# Patient Record
Sex: MALE | Race: ASIAN | Employment: OTHER | URBAN - METROPOLITAN AREA
[De-identification: names, ages, dates, MRNs, and addresses within clinical notes are randomized per-mention and may not be internally consistent; named-entity substitution may affect disease eponyms.]

---

## 2021-02-23 ENCOUNTER — HOSPITAL ENCOUNTER (OUTPATIENT)
Dept: PHYSICAL THERAPY | Age: 36
Discharge: HOME | End: 2021-02-23
Payer: OTHER GOVERNMENT

## 2021-02-23 PROCEDURE — 97166 OT EVAL MOD COMPLEX 45 MIN: CPT

## 2021-02-23 PROCEDURE — 97530 THERAPEUTIC ACTIVITIES: CPT

## 2021-02-23 NOTE — PROGRESS NOTES
OT DAILY TREATMENT NOTE     Patient Name: Stewart Duffy  Date:2021  : 1985  [x]  Patient  Verified  Payor:  / Plan: [employer] [facility] [address] AND DEPENDENTS / Product Type:  /    In time:1035  Out time:1215  Total Treatment Time (min): 40  Visit #: 1 of 8    Treatment Area: Upper extremity weakness [R29.898]  Cerebral infarction, unspecified [I63.9]    SUBJECTIVE  Pain Level (0-10 scale): 0/10  Any medication changes, allergies to medications, adverse drug reactions, diagnosis change, or new procedure performed?: [x] No    [] Yes (see summary sheet for update)  Subjective functional status/changes:   [] No changes reported  Headaches, eyes get tired, can't keep on lines, trouble remembering things    OBJECTIVE    30 min []Eval                  []Re-Eval         10 min Therapeutic Activity:  []  See flow sheet :   Rationale: scanning  to improve the patients ability to read  Scanning reading aloud reducing font size, spacing  until errors made       With   [] TE   [] TA   [] neuro   [] other: Patient Education: [x] Review HEP    [] Progressed/Changed HEP based on: use of colored guide  [] positioning   [] body mechanics   [] transfers   [] heat/ice application   [] Splint wear/care   [] Sensory re-education   [] scar management      [] other:            Other Objective/Functional Measures:   Subjective: pt is a right hand dominant, 35 y.o.y/o, male who had sudden onset of headache and confusion that did not resolve and went to ER was diagnosed with CVA, sent to hospital in [place] x 6 days  Prior level of function: [employer] , running, board games,movies,  TV,  Cooking, driving  Pain BFQSB:(4-DQ pain 10-debilitating pain) no    Headaches recurring  Current functional limitations/living situation:  no children, wife  [employer]    Medical hx: HTN    Medications: See the written copy of this report in the patient's paper medical record.        Objective:  ROM WNL:  Strength:WNL  Hand Strength:   Gross Grasp      Right  80      Left 70        Nine-Hole Peg Test:  Left= _16____seconds  Right=__16___seconds  Finger Opposition:WNL  ADLs Independent     Light Meal Prep:    []MaxA   []ModA   [x]Christine   [] CGA   []SBA   []Colleen   []Independent  Driving:       [x]MaxA   []ModA   []Christine   [] CGA   []SBA   []Colleen   []Independent  Money Mgmt:        []MaxA   []ModA   []Christine   [] CGA   []SBA   []Colleen   []IndependentTBA    Coordination   GM                           FM [x]WFL          [] Impaired   [x]WFL          [] Impaired    Tone/Motor Control [x]WFL          [] Impaired    Midline Symmetry [x]WFL          [] Impaired    Visual Perception:                    R/L   Neglect           R/L Discrimination                   Body Scheme []WFL          [] Impaired TBA  [x]WFL          [] Impaired     []WFL          [x] Impaired visual    [x]WFL          [] Impaired    Visual Motor Skills                           Tracking                           Tracing                            Writing   [x]WFL          [] Impaired     [x]WFL          [] Impaired   [x]WFL          [] Impaired    Cognition [x]Person         [x]Place            [x]Date   [x]Situation/ Behavior    Follows Commands  []     1-step  [] 2-step   [x]Multi-step      Memory:                             STM                             LTM   []WFL          [x] Impaired   [x]WFL          [] Impaired    Safety Awareness [x]WFL          [] Impaired    Judgment  [x]WFL          [] Impaired    Express Needs [x]WFL          [] Impaired           Pain Level (0-10 scale) post treatment: 0/10    ASSESSMENT/Changes in Function:    [x]  See Plan of Care  []  See progress note/recertification  []  See Discharge Summary           PLAN  []  Upgrade activities as tolerated     []  Continue plan of care  []  Update interventions per flow sheet       []  Discharge due to:_  []  Other:LAKSHMI Gotti/L 2/23/2021  10:38 AM    No future appointments.

## 2021-02-23 NOTE — PROGRESS NOTES
[facility] [place] [facility]  [address] [facility] [address] [facility][1]  [phone] fax    Plan of Care/Statement of Necessity for Occupational Therapy Services    Patient name: Sherley Neri Start of Care: 2021   Referral source: MD Xiomara Rojas, SLP : 1985    Medical Diagnosis: Cerebral infarction, unspecified [I63.9]  Visual field cut [H53.40]  Payor:  / Plan: [employer]PENDENTS / Product Type:  /  Onset Date:21    Treatment Diagnosis: Decreased scanning, cognitive skills   Prior Hospitalization: see medical history Provider#: [id number]   Medications: Verified on Patient summary List    Comorbidities: HTN, CVA   Prior Level of Function: [place] , running, board games,movies,  TV,  Cooking, driving          The Plan of Care and following information is based on the information from the initial evaluation. Assessment/ key information: pt is a right hand dominant, 29 y/o, male who had sudden onset of headache and confusion   that did not resolve while on vacation in [place] and went to ER was diagnosed with CVA, sent to hospital in [place] x 6 days. He denies pain at this time but reports mild intermittent headaches. He has full AROM of bilateral UEs and strength is 4+/5. Fine motor skills are WNL. He has difficulty scanning at table level and must use finger to keep place on line. With this strategy he is able tolocate targets well but slowly. He tends to start non reading task at right side and work his way towards. left. He is able to read letters spaced well in 16 font with guide under line with 90%  Accuracy. He reports difficulty with short term memory and with remembering what he has read. Reading is limited to short articles vs books due to eye fatigue and short term memory deficits.   He is performing self care without assist but is not currently working or driving due to vision and cognitive issues. His Brain Injury vision assessment Battery for Adults shows 5 areas of concern. Self reported assessment of visual performance shows difficulties in financial management, health management, and reading. He will benefit from skilled occupational therapy to improve ability to scan for information and complete computer and household tasks to allow him to return to work effectively. Evaluation Complexity: History LOW Complexity : Brief history review  Examination LOW Complexity : 1-3 performance deficits relating to physical, cognitive , or psychosocial skils that result in activity limitations and / or participation restrictions  Clinical Decision Making LOW Complexity : No comorbidities that affect functional and no verbal or physical assistance needed to complete eval tasks   Overall Complexity Rating: LOW     Problem List: Decreased ADL/functional abilities , Decreased activity tolerance and Other     Treatment Plan may include any combination of the following: Therapeutic activities, Patient education and ADLs/IADLs    Patient / Family readiness to learn indicated by: asking questions, trying to perform skills and interest    Persons(s) to be included in education:   patient (P)    Barriers to Learning/Limitations: yes;  cognitive, reading/writing and sensory deficits-vision/hearing/speech    Patient Goal (s): learn to cope    Patient Self Reported Health Status: good    Rehabilitation Potential: good    Short Term Goals: To be accomplished in 2 weeks:  1. Patient will be independent in use of strategies to improve ability to scan for information. 2. Patient will be familiar with home program activities to improve scanning and attention to left visual field. 3.  Patient will be familiar with strategies to improve recall of key facts in information read to improve pleasure and recall of content for work tasks.   4.  Patient will be able to visually shift from table level to computer level information in order to record and complete tasks at work. Long Term Goals: To be accomplished in 4 weeks:   1. Patient will be able to use strategies to enable him to return to reading and recalling information from  longer text (eg books)without eye fatigue . 2.  Patient will consistently scan left to right when performing all tasks without cueing. 3.  Patient will return to successful home financial management tasks due to improved visual scanning and attention/memeory. 4.  Patient will achieve 95% accuracy or better at locating embedded information in text. Frequency / Duration: Patient to be seen 2 times per week for 4 weeks:    Patient/ Caregiver education and instruction: Diagnosis, prognosis, self care, activity modification and exercises   [x]  Plan of care has been reviewed with LAKSHMI Lema/L 2/23/2021 3:02 PM    _____________________________________________________________________    I certify that the above Therapy Services are being furnished while the patient is under my care. I agree with the treatment plan and certify that this therapy is necessary.     [de-identified] Signature:____________Date:_________TIME:________     Kevinaniyahdavid Mom, Not On File, MD  ** Signature, Date and Time must be completed for valid certification **    Please sign and return to [facility] [employer] [facility]   [address] [place] [address]  [phone] fax

## 2021-02-23 NOTE — PROGRESS NOTES
[facility] [address] [facility] 
[phone] fax Plan of Care/Statement of Necessity for Occupational Therapy Services Patient name: Elissa Shannon Start of Care: 2021 Referral source: [facility], Not On File, MD : 1985 Medical Diagnosis: Cerebral infarction, unspecified [I63.9] Visual field cut [H53.40] Payor:  / Plan: [address] DEPENDENTS / Product Type:  /  Onset Date:*** Treatment Diagnosis: ***  
Prior Hospitalization: see medical history Provider#: [id number] Medications: Verified on Patient summary List  
 Comorbidities: *** 
 Prior Level of Function: *** The Plan of Care and following information is based on the information from the initial evaluation. Assessment/ key information: *** Evaluation Complexity: History {OT OP History :53857} Examination {OT OP Examination :30839} Clinical Decision Making {OT OP Decision Making :09586} Overall Complexity Rating: {PT OP Complexity:29649} Problem List: {BSHSI OT PROBLEM OHLE:88055} Treatment Plan may include any combination of the following: {BSHSI IP OT TREATMENT PLAN:} Patient / Family readiness to learn indicated by: {Outpt PT Patient Family Readiness to Learn:98126} Persons(s) to be included in education:   {BSHSI IP PATTERSON PT PERSONS PTTF:60956} Barriers to Learning/Limitations: {barriers to learning/limitations:73827} Patient Goal (s): *** 
 
Patient Self Reported Health Status: {Outpt PT Rehabilitation Potential:74149} Rehabilitation Potential: {Outpt PT Rehabilitation Potential:95534} Short Term Goals: To be accomplished in *** {BSHSI OP WEEKS/TREATMENTS:}: 
*** Long Term Goals: To be accomplished in *** {BSHSI OP WEEKS/TREATMENTS:}:  
*** Frequency / Duration: Patient to be seen *** times per week for *** {BSHSI OP WEEKS/TREATMENTS:}: 
 
 Patient/ Caregiver education and instruction: Diagnosis, prognosis, {Outpt PT patient caregiver ed.:00209} [x]  Plan of care has been reviewed with LAKSHMI Ruano/L 2/23/2021 3:29 PM 
 
_____________________________________________________________________ I certify that the above Therapy Services are being furnished while the patient is under my care. I agree with the treatment plan and certify that this therapy is necessary. Physician's Signature:____________Date:_________TIME:________ Bshsi, Not On File, MD 
** Signature, Date and Time must be completed for valid certification ** Please sign and return to [facility]  [address] [facility] 
[phone] fax

## 2021-03-02 ENCOUNTER — HOSPITAL ENCOUNTER (OUTPATIENT)
Dept: PHYSICAL THERAPY | Age: 36
Discharge: HOME | End: 2021-03-02
Payer: OTHER GOVERNMENT

## 2021-03-02 PROCEDURE — 97530 THERAPEUTIC ACTIVITIES: CPT

## 2021-03-02 NOTE — PROGRESS NOTES
OT DAILY TREATMENT NOTE     Patient Name: Dominick Hall  Date:3/2/2021  : 1985  [x]  Patient  Verified  Payor:  / Plan: [employer] [facility] [address] AND DEPENDENTS / Product Type:  /    In time:550  Out time:635  Total Treatment Time (min): 39  Visit #: 2 of 8    Treatment Area: Cerebral infarction, unspecified [I63.9]  Visual field cut [H53.40]    SUBJECTIVE  Pain Level (0-10 scale): 0/10  Any medication changes, allergies to medications, adverse drug reactions, diagnosis change, or new procedure performed?: [x] No    [] Yes (see summary sheet for update)  Subjective functional status/changes:   [] No changes reported  Headaches getting better    OBJECTIVE    45 min Therapeutic Activity:  []  See flow sheet :   Rationale: scanning , atttention  to improve the patients ability to recall information, read for accurate information  Scan boards numbers and letters no errors in sequence or locating chips  Recall with interference worksheet  Saccades reducing font size-no errors but slowing when reduced to 2M  Recall with interference recalling pairs of cards when another pair introduced    With   [] TE   [] TA   [] neuro   [] other: Patient Education: [x] Review HEP    [] Progressed/Changed HEP based on:  Reviewed chunking of numbers strategy  [] positioning   [] body mechanics   [] transfers   [] heat/ice application   [] Splint wear/care   [] Sensory re-education   [] scar management      [] other:            Other Objective/Functional Measures:   Recall with interference score 75%, errors or forgotten simple facts when more complex task introduced as interference.   Recall of cards seen 63%     Pain Level (0-10 scale) post treatment: 0/10    ASSESSMENT/Changes in Function: good ability to perform accurate saccades but speed slows as font decreases in size    Patient will continue to benefit from skilled OT services to modify and progress therapeutic interventions, instruct in home and community integration and scanning and recall to attain remaining goals. []  See Plan of Care  []  See progress note/recertification  []  See Discharge Summary         Progress towards goals / Updated goals:  1. Patient will be independent in use of strategies to improve ability to scan for information. 3/2/21 practicing saccades  2. Patient will be familiar with home program activities to improve scanning and attention to left visual field. 3/2/21 sudoku for homework,    3. Patient will be familiar with strategies to improve recall of key facts in information read to improve pleasure and recall of content for work tasks. 3/2/21 attention to task with playing cards task  4. Patient will be able to visually shift from table level to computer level information in order to record and complete tasks at work.       Long Term Goals: To be accomplished in 4 weeks:          1. Patient will be able to use strategies to enable him to return to reading and recalling information from  longer text (eg books)without eye fatigue . 2.  Patient will consistently scan left to right when performing all tasks without cueing. 3.  Patient will return to successful home financial management tasks due to improved visual scanning and attention/memeory.     4.  Patient will achieve 95% accuracy or better at locating embedded information in text    PLAN  []  Upgrade activities as tolerated     [x]  Continue plan of care  []  Update interventions per flow sheet       []  Discharge due to:_  []  Other:_      LAKSHMI Hayes/L 3/2/2021  2:56 PM    Future Appointments   Date Time Provider Alicia Segura   3/2/2021  6:00 PM Tally Spray, OTR/L [facility]   3/9/2021  6:00 PM Tally [place], OTR/L [facility]   3/16/2021  6:00 PM Tally Spray, OTR/L [facility]   3/23/2021  6:00 PM Tally Spray, OTR/L [facility]

## 2021-03-09 ENCOUNTER — HOSPITAL ENCOUNTER (OUTPATIENT)
Dept: PHYSICAL THERAPY | Age: 36
Discharge: HOME | End: 2021-03-09
Payer: OTHER GOVERNMENT

## 2021-03-09 PROCEDURE — 97530 THERAPEUTIC ACTIVITIES: CPT

## 2021-03-09 NOTE — PROGRESS NOTES
OT DAILY TREATMENT NOTE     Patient Name: Devi Gonzales  Date:3/9/2021  : 1985  [x]  Patient  Verified  Payor:  / Plan: [facility]  ACTIVE DUTY AND DEPENDENTS / Product Type:  /    In time:600  Out time:645  Total Treatment Time (min): 45  Visit #: 3 of 8    Treatment Area: Cerebral infarction, unspecified [I63.9]  Unspecified visual field defects [H53.40]    SUBJECTIVE  Pain Level (0-10 scale): 0/10  Any medication changes, allergies to medications, adverse drug reactions, diagnosis change, or new procedure performed?: [x] No    [] Yes (see summary sheet for update)  Subjective functional status/changes:   [] No changes reported  No problem with soduku    OBJECTIVE  Oh, I didn't get that ( re instructions for worksheet)            45 min Therapeutic Activity:  []  See flow sheet :   Rationale: Scanning, recall tasks  to improve the patients ability to locate items in left visual field, use memory strategies  Scanning worksheet locating numbers in order-4 trials  Scanning worksheet for embedded 88 in varied numbers  Scanning task from table to \"computer level\" completing 1/4 inch complex peg design x 2  Recall task using categorization strategy to recall 12 words in 3 categories    With   [] TE   [] TA   [] neuro   [] other: Patient Education: [x] Review HEP    [] Progressed/Changed HEP based on: categorization strategy, worksheet  [] positioning   [] body mechanics   [] transfers   [] heat/ice application   [] Splint wear/care   [] Sensory re-education   [] scar management      [] other:            Other Objective/Functional Measures:   No errors in Soduku  No errors in embedded 88 task  REquired multiple trials to complete task of finding numbers in order, despite instructions being repeated and written as well as oral  Word list recall  with one confabulation  Trial following creating categories  with 1 confabulation, 1 error in category     Pain Level (0-10 scale) post treatment: 0/10    ASSESSMENT/Changes in Function: Difficulty following instructions , poor recall even with use of categorization strategy    Patient will continue to benefit from skilled OT services to modify and progress therapeutic interventions and instruct in home and community integration to attain remaining goals. []  See Plan of Care  []  See progress note/recertification  []  See Discharge Summary         Progress towards goals / Updated goals:  1.  Patient will be independent in use of strategies to improve ability to scan for information. 3/2/21 practicing saccades  2. Patient will be familiar with home program activities to improve scanning and attention to left visual field. 3/2/21 sudoku for homework,    3/9/21 performed soduku well    3.  Patient will be familiar with strategies to improve recall of key facts in information read to improve pleasure and recall of content for work tasks. 3/2/21 attention to task with playing cards task  3/9/21 working on categorization strategy  4.  Patient will be able to visually shift from table level to computer level information in order to record and complete tasks at work. 3/9/21 good performance staying on line with peg task on varied planes and across visual field      Long Term Goals: To be accomplished in 4 weeks:          1.  Patient will be able to use strategies to enable him to return to reading and recalling information from  longer text (eg books)without eye fatigue .   2.  Patient will consistently scan left to right when performing all tasks without cueing.    3.  Patient will return to successful home financial management tasks due to improved visual scanning and attention/memeory.    4.  Patient will achieve 95% accuracy or better at locating embedded information in text    PLAN  []  Upgrade activities as tolerated     []  Continue plan of care  []  Update interventions per flow sheet       []  Discharge due to:_  []  Other:_      Vassie Peabody Oniel López, OTR/L 3/9/2021  3:00 PM    Future Appointments   Date Time Provider Alicia Imelda   3/9/2021  6:00 PM Anabella Ceja, OTR/L [facility]   3/16/2021  6:00 PM Anabella Ceja, OTR/L [facility]   3/23/2021  6:00 PM Anabella Ceja, OTR/L [facility]

## 2021-03-16 ENCOUNTER — HOSPITAL ENCOUNTER (OUTPATIENT)
Dept: PHYSICAL THERAPY | Age: 36
Discharge: HOME | End: 2021-03-16
Payer: OTHER GOVERNMENT

## 2021-03-16 PROCEDURE — 97530 THERAPEUTIC ACTIVITIES: CPT

## 2021-03-16 NOTE — PROGRESS NOTES
OT DAILY TREATMENT NOTE     Patient Name: Jessenia Brands  Date:3/16/2021  : 1985  [x]  Patient  Verified  Payor:  / Plan: [employer] [facility] AND DEPENDENTS / Product Type:  /    In time:600  Out time:645  Total Treatment Time (min): 45  Visit #: 4 of 8    Treatment Area: Cerebral infarction, unspecified [I63.9]  Unspecified visual field defects [H53.40]    SUBJECTIVE  Pain Level (0-10 scale): 0/10  Any medication changes, allergies to medications, adverse drug reactions, diagnosis change, or new procedure performed?: [x] No    [] Yes (see summary sheet for update)  Subjective functional status/changes:   [] No changes reported  Did better with the categories    OBJECTIVE         45 min Therapeutic Activity:  []  See flow sheet :   Rationale: recal and scanning  to improve the patients ability to perform job tasks  Card recall pairs with interference  Reading 16 font with close spacing  Reading paragraph for comprehension/memory of content  Recall 12 words in categories when word list viewed for 1 minute  Recall of names and matching to faces             With   [] TE   [] TA   [] neuro   [] other: Patient Education: [x] Review HEP    [] Progressed/Changed HEP based on: progress made  [] positioning   [] body mechanics   [] transfers   [] heat/ice application   [] Splint wear/care   [] Sensory re-education   [] scar management      [] other:            Other Objective/Functional Measures:   Card recall 75% (63 first day)  Recall word list 11/12 homework, 12/12 today  Matching faces to names 5/5, 4/5  Reading 16pt font no errors no use of finger to keep on line  REading comprehension 3/7 questions correct, 1/5 questions correct     Pain Level (0-10 scale) post treatment: 0/10    ASSESSMENT/Changes in Function: Improved recall using categories, improved ability to keep on line when scanning without use of finger or guide, poor reading comprehension/recall     Patient will continue to benefit from skilled OT services to modify and progress therapeutic interventions and instruct in home and community integration to attain remaining goals. []  See Plan of Care  []  See progress note/recertification  []  See Discharge Summary         Progress towards goals / Updated goals:  1.  Patient will be independent in use of strategies to improve ability to scan for information. 3/2/21 practicing saccades  3/16/21 good accuracy of scanning today  2. Patient will be familiar with home program activities to improve scanning and attention to left visual field.   3/2/21 sudoku for homework,    3/9/21 performed soduku well  3/16/21 met with cheo     3.  Patient will be familiar with strategies to improve recall of key facts in information read to improve pleasure and recall of content for work tasks. 3/2/21 attention to task with playing cards task  3/9/21 working on categorization strategy  3/16/21 good performance with strategy, still with poor recall of important facts in paragraph  4.  Patient will be able to visually shift from table level to computer level information in order to record and complete tasks at work. 3/9/21 good performance staying on line with peg task on varied planes and across visual field      Long Term Goals: To be accomplished in 4 weeks:          1.  Patient will be able to use strategies to enable him to return to reading and recalling information from  longer text (eg books)without eye fatigue .   2.  Patient will consistently scan left to right when performing all tasks without cueing.    3.  Patient will return to successful home financial management tasks due to improved visual scanning and attention/memeory.    4.  Patient will achieve 95% accuracy or better at locating embedded information in text    PLAN  []  Upgrade activities as tolerated     []  Continue plan of care  []  Update interventions per flow sheet       []  Discharge due to:_  []  Other:_      Aura Henao OTR/L 3/16/2021  6:23 PM    Future Appointments   Date Time Provider Alicia Segura   3/23/2021  6:00 PM Sasha Becerra, OTR/L [facility]

## 2021-03-30 ENCOUNTER — HOSPITAL ENCOUNTER (OUTPATIENT)
Dept: PHYSICAL THERAPY | Age: 36
Discharge: HOME | End: 2021-03-30
Payer: OTHER GOVERNMENT

## 2021-03-30 PROCEDURE — 97530 THERAPEUTIC ACTIVITIES: CPT

## 2021-03-30 PROCEDURE — 97535 SELF CARE MNGMENT TRAINING: CPT

## 2021-03-30 NOTE — PROGRESS NOTES
OT DAILY TREATMENT NOTE     Patient Name: Linda Abbott  Date:3/30/2021  : 1985  [x]  Patient  Verified  Payor:  / Plan: [facility]  ACTIVE DUTY AND DEPENDENTS / Product Type:  /    In time:515  Out time:610  Total Treatment Time (min): 54  Visit #: 1 of 4  Treatment Area: Cerebral infarction, unspecified [I63.9]  Unspecified visual field defects [H53.40]    SUBJECTIVE  Pain Level (0-10 scale): 0/10  Any medication changes, allergies to medications, adverse drug reactions, diagnosis change, or new procedure performed?: [x] No    [] Yes (see summary sheet for update)  Subjective functional status/changes:   [] No changes reported  Glad to be going back to work    OBJECTIVE    30 min Therapeutic Activity:  []  See flow sheet :   Rationale: scanning problem solving, recall for informaiton read  to improve the patients ability to perform work tasks without difficulty  REcall, memory strategy tasks using worksheets and informaiton gathered when read, heard or seen  REading organizing information task worksheet        25 min Self Care/Home Management: Functional skills for work tasks   Rationale: strategy development  to improve the patients ability to problem solving and completing tasks at work  Computer task to generate chart from paper table level giving information on hours worked by employee, hours of services, number of checks per month  Practical math tasks word problems to work on reading accurately to develop results      With   [] TE   [] TA   [] neuro   [] other: Patient Education: [x] Review HEP    [] Progressed/Changed HEP based on: use of visualization strategy, making up sentence to recall facts, underlining, paraphrasing  [] positioning   [] body mechanics   [] transfers   [] heat/ice application   [] Splint wear/care   [] Sensory re-education   [] scar management      [] other:            Other Objective/Functional Measures:   Completed charts on computer dissecting and organizing information well, completing formulas etc  Recalled 2/2 items heard in sentence 100%  Recalled 3/6 words in 3 word sequences seen correctly in order  Recalled 8/8 words in 4 word sequence seen correctly in order  Recalled key element in story heard to respond to question 5/6 trials  Recalled 9/10 facts about house seen  Errors in alphabetizing and performing tasks according to instruction, missing one fact when completing problem solving tasks     Pain Level (0-10 scale) post treatment: 0/10    ASSESSMENT/Changes in Function: Better recall when heard vs read himself, better recall with visualization    Patient will continue to benefit from skilled OT services to modify and progress therapeutic interventions and instruct in home and community integration to attain remaining goals. []  See Plan of Care  []  See progress note/recertification  []  See Discharge Summary         Progress towards goals / Updated goals:  3.  Patient will be familiar with strategies to improve recall of key facts in information read to improve pleasure and recall of content for work tasks. 3/30/21 reviewed strategies, used visualization well     4.  Patient will be able to visually shift from table level to computer level information in order to record and complete tasks at work.   3/30/21 Performed well in clinic simulation     LTG  4.  Patient will achieve 95% accuracy or better at locating embedded information in text  Current status; Progressing, still with occasional errors     PLAN  []  Upgrade activities as tolerated     []  Continue plan of care  []  Update interventions per flow sheet       []  Discharge due to:_  []  Other:_      LAKSHMI Martin/L 3/30/2021  5:20 PM    Future Appointments   Date Time Provider Alicia Segura   4/6/2021  5:15 PM Josephine Mccord OTR/L [facility]

## 2021-04-06 ENCOUNTER — HOSPITAL ENCOUNTER (OUTPATIENT)
Dept: PHYSICAL THERAPY | Age: 36
Discharge: HOME | End: 2021-04-06
Payer: OTHER GOVERNMENT

## 2021-04-06 PROCEDURE — 97530 THERAPEUTIC ACTIVITIES: CPT

## 2021-04-06 NOTE — PROGRESS NOTES
OT DAILY TREATMENT NOTE     Patient Name: Jonny Peter  Date:2021  : 1985  [x]  Patient  Verified  Payor:  / Plan: [employer] [facility] [address] AND DEPENDENTS / Product Type:  /    In time:520  Out time:600  Total Treatment Time (min): 40  Visit #: 2 of 4    Treatment Area: Cerebral infarction, unspecified [I63.9]  Unspecified visual field defects [H53.40]    SUBJECTIVE  Pain Level (0-10 scale): 0/10  Any medication changes, allergies to medications, adverse drug reactions, diagnosis change, or new procedure performed?: [x] No    [] Yes (see summary sheet for update)  Subjective functional status/changes:   [] No changes reported  Back at work from home 1/2 days, it is going ok    OBJECTIVE       40 min Therapeutic Activity:  []  See flow sheet :   Rationale: improve memory strategies  to improve the patients ability to return to work and leisure skills  REturned homework word problems  Reading and sorting information according to instructions  Completing sequences heard  Review of home program, strategies             With   [] TE   [] TA   [] neuro   [] other: Patient Education: [x] Review HEP    [] Progressed/Changed HEP based on:  strategies  [] positioning   [] body mechanics   [] transfers   [] heat/ice application   [] Splint wear/care   [] Sensory re-education   [] scar management      [] other:            Other Objective/Functional Measures:   No errors in verbal sequences  3/16 errors in word problems due ot reading errors  1 error in following instructions due to reading problem  Error in functional problem solving based on own knowledge of personal situation vs what is implied by story   [employer] no longer any issues identified  Self Report assessment of visual performance-slight ongoing issues with reading longer amounts of text for pleasure  Pain Level (0-10 scale) post treatment: 0/10    ASSESSMENT/Changes in Function: Improved recall, improved vision,      []  See Plan of Care  []  See progress note/recertification  [x]  See Discharge Summary         Progress towards goals / Updated goals:  3.  Patient will be familiar with strategies to improve recall of key facts in information read to improve pleasure and recall of content for work tasks. 3/30/21 reviewed strategies, used visualization well  4/6/21 Met     4.  Patient will be able to visually shift from table level to computer level information in order to record and complete tasks at work.   3/30/21 Performed well in clinic simulation  4/6/21 Met     LTG  4.  Patient will achieve 95% accuracy or better at locating embedded information in text  Current status; Progressing, still with occasional errors  4/6/21 Met, occasional errors, instructed to read things twice and take notes     PLAN  []  Upgrade activities as tolerated     []  Continue plan of care  []  Update interventions per flow sheet       [x]  Discharge due to:goals met, partially met_  []  Other:_      LAKSHMI Rodríguez/L 4/6/2021  4:25 PM    Future Appointments   Date Time Provider Alicia Segura   4/6/2021  5:15 PM Doc Monroe OTR/L [facility]

## 2021-04-15 NOTE — PROGRESS NOTES
[facility] Monae  [address]  [phone] fax    Occupational Therapy Discharge Summary    Patient name: Anders Blevins Start of Care: 21   Referral source: Evgeny De Anda MD : 1985   Medical/Treatment Diagnosis: Cerebral infarction, unspecified [I63.9]  Unspecified visual field defects [H53.40]  Payor: [employer] / Plan: [employer] ACTIVE DUTY AND DEPENDENTS / Product Type: [employer] /  Onset Date:21     Prior Hospitalization: see medical history Provider#: [id number]   Medications: Verified on Patient Summary List    Comorbidities: HTN, CVA  Prior Level of Function:[employer] , running, board [employer],  Cooking, driving                        Visits from Start of Care: 7   Missed Visits: 0  Reporting Period : 3/23/21 to 21    Summary of Care:Patient seen for visual scanning, complex problem solving, executive function, recall and reading for information to facilitate successful return to work. He has home program strategies to improve performance. 3.  Patient will be familiar with strategies to improve recall of key facts in information read to improve pleasure and recall of content for work tasks. Status at PN:reviewed strategies, used visualization well  Discharge status: Met     4.  Patient will be able to visually shift from table level to computer level information in order to record and complete tasks at work. Status at PN: Performed well in clinic simulation  Discharge status: Met     LTG  4.  Patient will achieve 95% accuracy or better at locating embedded information in text  Status at PN; Progressing, still with occasional errors  Discharge status: Met, occasional errors, instructed to read things twice and take notes     ASSESSMENT/Changes in Function: Excellent improvement in scanning and saccades. No longer needs to use guides or anchoring to stay on line. No longer reports visual fatigue. He has shown excellent improvement in short term memory for information but continues to demonstrate some errors when reading for information. He has been advised to read twice and take notes to assure accuracy of completion of instructions in work tasks. He no longer reports any issues on the Brain Injury Visual Assessment and notes only one area of difficulty on Self report  Assessment of Visual Performance (reading longer text for content). He has returned to work and completion of financial management at home.       ASSESSMENT/RECOMMENDATIONS:  [x]Discontinue therapy: [x]Patient has reached or is progressing toward set goals      []Patient is non-compliant or has abdicated      []Due to lack of appreciable progress towards set goals    Nini Barksdale, OTR/L 4/15/2021 12:21 PM